# Patient Record
Sex: FEMALE | Race: WHITE | URBAN - METROPOLITAN AREA
[De-identification: names, ages, dates, MRNs, and addresses within clinical notes are randomized per-mention and may not be internally consistent; named-entity substitution may affect disease eponyms.]

---

## 2023-12-21 ENCOUNTER — APPOINTMENT (OUTPATIENT)
Dept: URBAN - METROPOLITAN AREA CLINIC 254 | Age: 48
Setting detail: DERMATOLOGY
End: 2023-12-23

## 2023-12-21 VITALS — HEIGHT: 55 IN | WEIGHT: 115 LBS

## 2023-12-21 DIAGNOSIS — D22 MELANOCYTIC NEVI: ICD-10-CM

## 2023-12-21 DIAGNOSIS — D49.2 NEOPLASM OF UNSPECIFIED BEHAVIOR OF BONE, SOFT TISSUE, AND SKIN: ICD-10-CM

## 2023-12-21 DIAGNOSIS — L81.4 OTHER MELANIN HYPERPIGMENTATION: ICD-10-CM

## 2023-12-21 PROBLEM — D22.72 MELANOCYTIC NEVI OF LEFT LOWER LIMB, INCLUDING HIP: Status: ACTIVE | Noted: 2023-12-21

## 2023-12-21 PROCEDURE — OTHER REASSURANCE: OTHER

## 2023-12-21 PROCEDURE — OTHER COUNSELING: OTHER

## 2023-12-21 PROCEDURE — 11102 TANGNTL BX SKIN SINGLE LES: CPT

## 2023-12-21 PROCEDURE — OTHER MIPS QUALITY: OTHER

## 2023-12-21 PROCEDURE — 99203 OFFICE O/P NEW LOW 30 MIN: CPT | Mod: 25

## 2023-12-21 PROCEDURE — OTHER BIOPSY BY SHAVE METHOD: OTHER

## 2023-12-21 ASSESSMENT — LOCATION ZONE DERM
LOCATION ZONE: FACE
LOCATION ZONE: LEG

## 2023-12-21 ASSESSMENT — LOCATION DETAILED DESCRIPTION DERM
LOCATION DETAILED: LEFT SUPERIOR MEDIAL BUCCAL CHEEK
LOCATION DETAILED: RIGHT INFERIOR FOREHEAD
LOCATION DETAILED: LEFT ANTERIOR DISTAL THIGH

## 2023-12-21 ASSESSMENT — LOCATION SIMPLE DESCRIPTION DERM
LOCATION SIMPLE: RIGHT FOREHEAD
LOCATION SIMPLE: LEFT THIGH
LOCATION SIMPLE: LEFT CHEEK

## 2023-12-21 NOTE — PROCEDURE: BIOPSY BY SHAVE METHOD
Hide Biopsy Depth?: Yes
Render Path Notes In Note?: No
Curettage Text: The wound bed was treated with curettage after the biopsy was performed.
Lab: [id number]
Biopsy Method: Dermablade
Anesthesia Volume In Cc: 0.3
Billing Type: Third-Party Bill
Wound Care: Aquaphor
Cryotherapy Text: The wound bed was treated with cryotherapy after the biopsy was performed.
Size Of Lesion In Cm: 0
Consent: - Consent was obtained and risks were reviewed prior to procedure today. All questions were answered prior to procedure today.\\n- Risks discussed include but are not limited to scarring, infection, bleeding, scabbing, incomplete removal, nerve damage, pain, and allergy to anesthesia.
Electrodesiccation Text: The wound bed was treated with electrodesiccation after the biopsy was performed.
Detail Level: Detailed
Dressing: bandage
Electrodesiccation And Curettage Text: The wound bed was treated with electrodesiccation and curettage after the biopsy was performed.
Silver Nitrate Text: The wound bed was treated with silver nitrate after the biopsy was performed.
Depth Of Biopsy: dermis
Post-Care Instructions: WOUND CARE:\\nDo NOT submerge wound in a bath, swimming pool, or hot tub for at least 1 week or for as long as there is an open wound. Gently cleanse the site daily with mild soap and water. Be careful NOT to allow a forceful stream of water to hit the biopsy site. After cleaning/showering, apply a thin layer of petrolatum ointment or Aquaphor in the wound followed by an adhesive bandage. Continue this process daily until healed. \\n\\nBLEEDING:\\nIf you develop persistent bleeding, apply firm and steady pressure over the dressing with gauze for 15 minutes. If bleeding persists, reapply pressure with an ice pack over the gauze for 15 minutes. NEVER APPLY ICE DIRECTLY TO THE SKIN. Do NOT peek under the gauze during these 15 minutes of pressure.  Call our office at [phone] if you cannot get the bleeding to stop. \\n\\nINFECTION:\\nSigns of infection may include increasing rather than decreasing pain (after the first few days), increasing redness/swelling/heat, draining pus, pink/red streaks around the wound, and fever or chills.  Please call our office immediately at [phone] if infection is suspected. It is normal to have some mild redness on or around the wound edges; this will lighten day by day and will become less tender.\\n\\nPAIN:\\nPain is usually minimal, but if needed you may take acetaminophen (Tylenol) every four hours as needed. Applying an ice pack over the dressing for 15-20 minutes every 2-3 hours will relieve swelling, lessen pain, and help minimize bruising. NEVER APPLY ICE DIRECTLY TO THE SKIN. Avoid ibuprofen (Advil, Motrin) and naproxen (Aleve) for the first 48 hours as these can increase bleeding.\\n\\nSWELLING AND BRUISING:\\nSwelling and bruising are common and temporary, usually lasting 1 - 2 weeks. It is more common in areas treated around the eyes, hands, and feet. In the days following the procedure, swelling and bruising can be minimized by keeping the affected areas elevated when possible, reducing salty foods, and applying ice packs over the dressing for 15-20 minutes every 2-3 hours. NEVER APPLY ICE DIRECTLY TO THE SKIN.\\n\\nITCHING:\\nItchiness on and around the wound is very common and can last several days to weeks after surgery. Mild itch is normal as the wound is healing. \\n\\nNERVE CHANGES:\\nNumbness is usually temporary, but it may last for several weeks to months. You may also experience sharp pains at the wound sight as it heals.  Mild pain is normal and will gradually improve with time.\\n \\nNO SMOKING:\\nSmoking will delay the healing process. If you smoke, we recommend trying to quit or at minimum reduce how much you smoke following a procedure.
Notification Instructions: - It can take up to 2 -3 weeks to get a biopsy result. \\n- Please refrain from calling to request results until after 2 weeks.
Anesthesia Type: 1% lidocaine with epinephrine
Type Of Destruction Used: Curettage
Information: Selecting Yes will display possible errors in your note based on the variables you have selected. This validation is only offered as a suggestion for you. PLEASE NOTE THAT THE VALIDATION TEXT WILL BE REMOVED WHEN YOU FINALIZE YOUR NOTE. IF YOU WANT TO FAX A PRELIMINARY NOTE YOU WILL NEED TO TOGGLE THIS TO 'NO' IF YOU DO NOT WANT IT IN YOUR FAXED NOTE.
Hemostasis: Drysol
Biopsy Type: H and E